# Patient Record
Sex: FEMALE | Race: BLACK OR AFRICAN AMERICAN
[De-identification: names, ages, dates, MRNs, and addresses within clinical notes are randomized per-mention and may not be internally consistent; named-entity substitution may affect disease eponyms.]

---

## 2018-02-03 ENCOUNTER — HOSPITAL ENCOUNTER (EMERGENCY)
Dept: HOSPITAL 92 - SCSER | Age: 63
Discharge: HOME | End: 2018-02-03
Payer: COMMERCIAL

## 2018-02-03 DIAGNOSIS — Z79.4: ICD-10-CM

## 2018-02-03 DIAGNOSIS — Z79.899: ICD-10-CM

## 2018-02-03 DIAGNOSIS — M51.27: ICD-10-CM

## 2018-02-03 DIAGNOSIS — E11.9: ICD-10-CM

## 2018-02-03 DIAGNOSIS — G47.00: ICD-10-CM

## 2018-02-03 DIAGNOSIS — G89.29: ICD-10-CM

## 2018-02-03 DIAGNOSIS — M48.061: Primary | ICD-10-CM

## 2018-02-03 DIAGNOSIS — I10: ICD-10-CM

## 2018-02-03 DIAGNOSIS — M10.9: ICD-10-CM

## 2018-02-03 DIAGNOSIS — E66.9: ICD-10-CM

## 2018-02-03 PROCEDURE — 99282 EMERGENCY DEPT VISIT SF MDM: CPT

## 2018-09-12 ENCOUNTER — HOSPITAL ENCOUNTER (OUTPATIENT)
Dept: HOSPITAL 92 - RAD | Age: 63
Discharge: HOME | End: 2018-09-12
Attending: PSYCHIATRY & NEUROLOGY
Payer: COMMERCIAL

## 2018-09-12 DIAGNOSIS — M16.11: ICD-10-CM

## 2018-09-12 DIAGNOSIS — M54.9: Primary | ICD-10-CM

## 2018-09-12 DIAGNOSIS — M25.551: ICD-10-CM

## 2018-09-12 PROCEDURE — 72100 X-RAY EXAM L-S SPINE 2/3 VWS: CPT

## 2018-09-12 NOTE — RAD
LUMBAR SPINE THREE VIEWS:

 

HISTORY:

Disability.  Pain.

 

COMPARISON:

11/09/2004

 

FINDINGS:

There are five lumbar type vertebral bodies.  Vertebral body height is maintained.  Preservation of d
isk space height.  No spondylolisthesis or spondylolysis.

 

IMPRESSION:

Unremarkable two views lumbar spine.

 

POS: [facility]

## 2018-09-12 NOTE — RAD
RIGHT HIP TWO VIEWS:

 

History: Disability. Pain. 

 

Comparison: None. 

 

FINDINGS: 

Mild loss of joint space height. Contour of the femoral head is maintained. No fracture. 

 

IMPRESSION: 

Mild degenerative change. 

 

POS: [facility]